# Patient Record
Sex: FEMALE | Race: WHITE | NOT HISPANIC OR LATINO | URBAN - METROPOLITAN AREA
[De-identification: names, ages, dates, MRNs, and addresses within clinical notes are randomized per-mention and may not be internally consistent; named-entity substitution may affect disease eponyms.]

---

## 2019-02-11 ENCOUNTER — OFFICE (OUTPATIENT)
Dept: URBAN - METROPOLITAN AREA CLINIC 19 | Facility: CLINIC | Age: 44
End: 2019-02-11

## 2019-02-11 VITALS
HEIGHT: 63 IN | HEART RATE: 80 BPM | SYSTOLIC BLOOD PRESSURE: 102 MMHG | WEIGHT: 119 LBS | DIASTOLIC BLOOD PRESSURE: 56 MMHG

## 2019-02-11 DIAGNOSIS — Z80.0 FAMILY HISTORY OF MALIGNANT NEOPLASM OF DIGESTIVE ORGANS: ICD-10-CM

## 2019-02-11 DIAGNOSIS — R68.81 EARLY SATIETY: ICD-10-CM

## 2019-02-11 DIAGNOSIS — R14.0 ABDOMINAL DISTENSION (GASEOUS): ICD-10-CM

## 2019-02-11 DIAGNOSIS — R10.13 EPIGASTRIC PAIN: ICD-10-CM

## 2019-02-11 PROCEDURE — 99213 OFFICE O/P EST LOW 20 MIN: CPT | Performed by: INTERNAL MEDICINE

## 2019-02-11 RX ORDER — SODIUM PICOSULFATE, MAGNESIUM OXIDE, AND ANHYDROUS CITRIC ACID 10; 3.5; 12 MG/160ML; G/160ML; G/160ML
LIQUID ORAL
Qty: 1 | Refills: 0 | Status: COMPLETED
Start: 2019-02-11 | End: 2019-07-18

## 2019-07-18 ENCOUNTER — AMBULATORY SURGICAL CENTER (OUTPATIENT)
Dept: URBAN - METROPOLITAN AREA SURGERY 2 | Facility: SURGERY | Age: 44
End: 2019-07-18

## 2019-07-18 VITALS
TEMPERATURE: 98.2 F | SYSTOLIC BLOOD PRESSURE: 103 MMHG | SYSTOLIC BLOOD PRESSURE: 103 MMHG | OXYGEN SATURATION: 100 % | SYSTOLIC BLOOD PRESSURE: 98 MMHG | SYSTOLIC BLOOD PRESSURE: 87 MMHG | OXYGEN SATURATION: 98 % | RESPIRATION RATE: 17 BRPM | TEMPERATURE: 97.7 F | HEART RATE: 77 BPM | OXYGEN SATURATION: 99 % | DIASTOLIC BLOOD PRESSURE: 51 MMHG | RESPIRATION RATE: 14 BRPM | SYSTOLIC BLOOD PRESSURE: 91 MMHG | OXYGEN SATURATION: 98 % | SYSTOLIC BLOOD PRESSURE: 87 MMHG | TEMPERATURE: 98.2 F | HEART RATE: 75 BPM | DIASTOLIC BLOOD PRESSURE: 60 MMHG | SYSTOLIC BLOOD PRESSURE: 98 MMHG | RESPIRATION RATE: 15 BRPM | DIASTOLIC BLOOD PRESSURE: 60 MMHG | HEART RATE: 87 BPM | DIASTOLIC BLOOD PRESSURE: 60 MMHG | HEART RATE: 79 BPM | OXYGEN SATURATION: 99 % | HEART RATE: 79 BPM | WEIGHT: 110 LBS | DIASTOLIC BLOOD PRESSURE: 54 MMHG | SYSTOLIC BLOOD PRESSURE: 91 MMHG | RESPIRATION RATE: 17 BRPM | DIASTOLIC BLOOD PRESSURE: 54 MMHG | HEART RATE: 87 BPM | HEIGHT: 63 IN | DIASTOLIC BLOOD PRESSURE: 51 MMHG | RESPIRATION RATE: 15 BRPM | WEIGHT: 110 LBS | DIASTOLIC BLOOD PRESSURE: 54 MMHG | SYSTOLIC BLOOD PRESSURE: 103 MMHG | HEIGHT: 63 IN | OXYGEN SATURATION: 100 % | OXYGEN SATURATION: 99 % | HEART RATE: 77 BPM | DIASTOLIC BLOOD PRESSURE: 51 MMHG | HEART RATE: 77 BPM | WEIGHT: 110 LBS | HEART RATE: 79 BPM | HEART RATE: 75 BPM | HEIGHT: 63 IN | TEMPERATURE: 98.2 F | SYSTOLIC BLOOD PRESSURE: 87 MMHG | SYSTOLIC BLOOD PRESSURE: 91 MMHG | RESPIRATION RATE: 14 BRPM | RESPIRATION RATE: 14 BRPM | OXYGEN SATURATION: 98 % | SYSTOLIC BLOOD PRESSURE: 98 MMHG | RESPIRATION RATE: 15 BRPM | TEMPERATURE: 97.7 F | OXYGEN SATURATION: 100 % | TEMPERATURE: 97.7 F | RESPIRATION RATE: 17 BRPM | HEART RATE: 75 BPM | HEART RATE: 87 BPM

## 2019-07-18 DIAGNOSIS — R10.13 EPIGASTRIC PAIN: ICD-10-CM

## 2019-07-18 DIAGNOSIS — Z80.0 FAMILY HISTORY OF MALIGNANT NEOPLASM OF DIGESTIVE ORGANS: ICD-10-CM

## 2019-07-18 DIAGNOSIS — Z12.11 ENCOUNTER FOR SCREENING FOR MALIGNANT NEOPLASM OF COLON: ICD-10-CM

## 2019-07-18 PROCEDURE — G0105 COLORECTAL SCRN; HI RISK IND: HCPCS | Performed by: INTERNAL MEDICINE

## 2019-07-18 PROCEDURE — 43235 EGD DIAGNOSTIC BRUSH WASH: CPT | Mod: 51 | Performed by: INTERNAL MEDICINE

## 2020-05-20 VITALS — HEIGHT: 63 IN

## 2020-05-21 ENCOUNTER — OFFICE (OUTPATIENT)
Dept: URBAN - METROPOLITAN AREA TELEHEALTH 10 | Facility: TELEHEALTH | Age: 45
End: 2020-05-21
Payer: COMMERCIAL

## 2020-05-21 DIAGNOSIS — Z80.0 FAMILY HISTORY OF MALIGNANT NEOPLASM OF DIGESTIVE ORGANS: ICD-10-CM

## 2020-05-21 DIAGNOSIS — K59.00 CONSTIPATION, UNSPECIFIED: ICD-10-CM

## 2020-05-21 RX ORDER — LINACLOTIDE 145 UG/1
CAPSULE, GELATIN COATED ORAL
Qty: 90 | Refills: 2 | Status: ACTIVE
Start: 2020-05-21

## 2020-05-21 NOTE — SERVICEHPINOTES
Mrs. Mai presents with worsening of her constipation.  She go week without a stool.  She had tried some Linzess in the past which worked but her insurance did not have prescription coverage at that time.  She has tried fiber supplements without help.  She denies any abdominal pain.  She denies any rectal bleeding.  She also has a history of bloating and had been treated for SIBO in the past with Xifaxan.  She said her bloating is better and it is improved after she has a stool.  Her weight has been stable. She denies any rectal bleeding.  She had a colonoscopy last year for family history of colon cancer which was negative

## 2024-06-17 ENCOUNTER — OFFICE (OUTPATIENT)
Dept: URBAN - METROPOLITAN AREA CLINIC 19 | Facility: CLINIC | Age: 49
End: 2024-06-17

## 2024-06-17 VITALS
DIASTOLIC BLOOD PRESSURE: 78 MMHG | HEIGHT: 63 IN | OXYGEN SATURATION: 100 % | WEIGHT: 114 LBS | SYSTOLIC BLOOD PRESSURE: 122 MMHG | HEART RATE: 93 BPM

## 2024-06-17 DIAGNOSIS — K62.9 DISEASE OF ANUS AND RECTUM, UNSPECIFIED: ICD-10-CM

## 2024-06-17 DIAGNOSIS — Z80.0 FAMILY HISTORY OF MALIGNANT NEOPLASM OF DIGESTIVE ORGANS: ICD-10-CM

## 2024-06-17 DIAGNOSIS — K59.00 CONSTIPATION, UNSPECIFIED: ICD-10-CM

## 2024-06-17 PROCEDURE — 99203 OFFICE O/P NEW LOW 30 MIN: CPT | Performed by: INTERNAL MEDICINE

## 2024-06-17 RX ORDER — SODIUM PICOSULFATE, MAGNESIUM OXIDE, AND ANHYDROUS CITRIC ACID 12; 3.5; 1 G/175ML; G/175ML; MG/175ML
LIQUID ORAL
Qty: 1 | Refills: 0 | Status: ACTIVE
Start: 2024-06-17